# Patient Record
Sex: FEMALE | Race: WHITE
[De-identification: names, ages, dates, MRNs, and addresses within clinical notes are randomized per-mention and may not be internally consistent; named-entity substitution may affect disease eponyms.]

---

## 2018-09-02 ENCOUNTER — HOSPITAL ENCOUNTER (EMERGENCY)
Dept: HOSPITAL 60 - LB.ED | Age: 43
Discharge: HOME | End: 2018-09-02
Payer: COMMERCIAL

## 2018-09-02 DIAGNOSIS — R55: Primary | ICD-10-CM

## 2018-09-02 PROCEDURE — 70450 CT HEAD/BRAIN W/O DYE: CPT

## 2018-09-02 PROCEDURE — 99284 EMERGENCY DEPT VISIT MOD MDM: CPT

## 2018-09-02 PROCEDURE — 93005 ELECTROCARDIOGRAM TRACING: CPT

## 2018-09-02 PROCEDURE — 36415 COLL VENOUS BLD VENIPUNCTURE: CPT

## 2018-09-02 PROCEDURE — A0429 BLS-EMERGENCY: HCPCS

## 2018-09-02 PROCEDURE — G0480 DRUG TEST DEF 1-7 CLASSES: HCPCS

## 2018-09-02 PROCEDURE — 80053 COMPREHEN METABOLIC PANEL: CPT

## 2018-09-02 PROCEDURE — 85025 COMPLETE CBC W/AUTO DIFF WBC: CPT

## 2018-09-02 PROCEDURE — A0425 GROUND MILEAGE: HCPCS

## 2018-09-03 NOTE — CT
UNENHANCED BRAIN CT



Multislice acquisition through the brain without IV contrast was performed.  



No priors.  



There is bifrontal atrophy.  



No masses or mass effect.  No intracranial hemorrhage.  No evidence of acute or 
subacute infarct.  



There is minimal mucosal thickening in the right maxillary sinus consistent 
with chronic sinusitis.  



IMPRESSION: 

No acute intracranial abnormalities.  



[id number]
MTDD

## 2018-09-04 NOTE — ER
DATE OF SERVICE:  09/02/2018

 

HISTORY OF PRESENT ILLNESS:  The patient is a 43-year-old female who presents 
to the ER by

ambulance after 2 syncopal episodes.  Initially, she was sitting on a bench, 
fell over and

hit her head.  She was only out for a few seconds.  She woke back up.  She felt 
like she was

going to vomit.  She passed out again, this time for a little longer.  The 
patient was

brought in by ambulance.  On arrival, she was alert, oriented, in no apparent 
distress.  She

had been running earlier this morning in the sun.  She ran about 4 miles.  She 
drank a

little bit of water afterwards.  She also had a couple of drinks after that of 
alcoholic

beverages.  The patient did not have any nausea or vomiting after the syncopal 
episodes.

She did not have any vomiting before that, but did complain of nausea in 
between.

 

ALLERGIES:  NKDA.

 

CURRENT MEDICATIONS:  She takes a multivitamin pack, which is custom designed, 
so we do not

know what is in it.  She takes no other medication.

 

She has only ever had a foot surgery as far as past medical history.

 

PHYSICAL EXAMINATION:

GENERAL:  She is alert, oriented, in no apparent distress.  VITAL SIGNS:  O2 
saturation

100%, blood pressure was 103/70, and pulse rate was 67.  HEENT:  Generally 
unremarkable.

She is little tender on the right side of her scalp, but no obvious abrasions 
or ecchymosis

or swelling.  Pupils are equal, round, and reactive to light.  Throat appears 
normal.  NECK:

Supple.  No nodes.  Nontender to palpation.  LUNGS:  Clear.  HEART:  Regular 
sinus rhythm.

ABDOMEN:  Benign.  EXTREMITIES:  No clubbing, cyanosis, or edema.  NEUROLOGIC:  
Nonfocal.

We did do orthostatic vitals with no significant change, and the patient had a 
negative

Romberg.

 

LABORATORY DATA:  We did a noncontrast head CT, which appears normal.  CBC 
shows a normal

white count of 8.2, hemoglobin is 12.8.  Comprehensive metabolic panel is 
completely within

normal limits except for slightly decreased alkaline phosphatase of 38.  BUN is 
24,

creatinine is 0.96.  Blood alcohol level is 0.005.

 

ASSESSMENT:  Syncopal episode, most likely due to the exercising, decreased 
hydration, and

vasodilation from the alcohol.  She appears to just have a common syncopal 
episode.  We did

do a 12-

lead EKG also, which just shows sinus bradycardia with a rate in the 50s.  She 
was given 1 L

of IV fluid in the ER and discharged in good condition.

 

 

GITA/HARPREET

DD:  09/02/2018 17:24:52

DT:  09/02/2018 19:22:32

Job #:  [id number]

MTDD